# Patient Record
(demographics unavailable — no encounter records)

---

## 2024-12-10 NOTE — DISCUSSION/SUMMARY
[FreeTextEntry1] : In summary, this is a 69-year-old man with pericarditis in March of 2022, HLD, and early persistent atrial fibrillation. He is now s/p ablation. He is doing well. No recurrent AF.  He is off any anticoagulation as his [id number] score is 1 for age.  He is doing well on the metoprolol.  He can return to the office in 1 year.  He was instructed to call me if anything changes prior to that.   Mr. Jacome appeared to understand the whole discussion and verbalized that all of his questions were answered to his satisfaction.   Thank you for allowing me to be involved in the care of this pleasant man. Please feel free to contact me with any questions.

## 2024-12-10 NOTE — HISTORY OF PRESENT ILLNESS
[FreeTextEntry1] : Referring Physician: Soren Gruber MD   Dear Dr. Gruber:   Mr. Jacome was seen in the [facility] today. For our records, please allow me to summarize the history and my findings.   This pleasant 69-year-old man has a cardiovascular history significant for pericarditis in March of 2022, HLD, and atrial fibrillation. He presented to the ED in March of 2022 complaining of chest pain and was diagnosed at the time with acute pericarditis. He had had a viral infection about 4 weeks prior. He experienced an episode of atrial fibrillation with RVR during that hospitalization but converted to sinus rhythm on his own. He was discharged home with an event monitor which revealed no further AF episodes but showed episodes of pAT. He returned to see Dr Gruber on 6/13/22 and was noted to be in AF at the time. His exercise capacity had decreased as evidence by his stress test results.   He subsequently underwent an AF ablation in Sept 2022. Since, he reports doing well. He states his exercise capacity has increased back to its baseline. He has not felt any recurrent AF. He has no complaints today.   Mr. Jacome denies any recent history of chest pain, shortness of breath, palpitations, dizziness, or syncope.

## 2024-12-10 NOTE — CARDIOLOGY SUMMARY
[de-identified] : 7/14/22 - Atrial fibrillation with a ventricular rate of 103 bpm. 12/8/22 - Sinus rhythm at 90 bpm 5/11/23: Sinus rhythm at 71 bpm 11/9/2023: Sinus rhythm at 83 bpm 12/10/2024: Sinus rhythm at 78 beats per [de-identified] : 3/11/22 - 1. Calcified trileaflet aortic valve with normal opening.\par  2. Endocardium not well visualized; grossly normal left\par  ventricular systolic function.\par  3. Mild diastolic dysfunction (Stage I).\par  4. Normal right ventricular size and function.\par  5. Normal tricuspid valve. Mild tricuspid regurgitation.\par  6. Estimated pulmonary artery systolic pressure equals 33\par  mm Hg, assuming right atrial pressure equals 10  mm Hg,\par  consistent with normal pulmonary pressures. [de-identified] : 9/22: AF ablation